# Patient Record
Sex: FEMALE | Race: WHITE | NOT HISPANIC OR LATINO | URBAN - METROPOLITAN AREA
[De-identification: names, ages, dates, MRNs, and addresses within clinical notes are randomized per-mention and may not be internally consistent; named-entity substitution may affect disease eponyms.]

---
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
## 2022-04-27 NOTE — PROCEDURE: TREATMENT REGIMEN
Plan: Discussed BC or spironolactone to control the hormonal aspect of acne \\nPts mother states pt never filled her minocycline rx\\nAdvised that BC is more of a long term/regular basis regimen, pt has family hx of endometriosis/cysts. Risks were discussed \\nPt declines migraines with auras and blood clots\\nDiscussed Accutane and the side effects
Detail Level: Simple
Initiate Treatment: Sprintec qd\\nMinocycline x2 months\\nFabior foam qd chest and back before showering (1-2 minutes)\\nVeltin qhs
Otc Regimen: Cetaphil or cerave cleanser and moisturizer
Otc Regimen: Non scented or limited fragrance deodorant (dove)
no